# Patient Record
Sex: MALE | Race: WHITE | NOT HISPANIC OR LATINO | Employment: FULL TIME | URBAN - METROPOLITAN AREA
[De-identification: names, ages, dates, MRNs, and addresses within clinical notes are randomized per-mention and may not be internally consistent; named-entity substitution may affect disease eponyms.]

---

## 2024-09-19 ENCOUNTER — HOSPITAL ENCOUNTER (EMERGENCY)
Facility: CLINIC | Age: 54
Discharge: HOME | End: 2024-09-19
Attending: EMERGENCY MEDICINE | Admitting: EMERGENCY MEDICINE

## 2024-09-19 ENCOUNTER — APPOINTMENT (OUTPATIENT)
Dept: CT IMAGING | Facility: CLINIC | Age: 54
End: 2024-09-19
Attending: EMERGENCY MEDICINE

## 2024-09-19 VITALS
HEART RATE: 64 BPM | WEIGHT: 128.31 LBS | TEMPERATURE: 97.2 F | DIASTOLIC BLOOD PRESSURE: 71 MMHG | HEIGHT: 64 IN | SYSTOLIC BLOOD PRESSURE: 120 MMHG | OXYGEN SATURATION: 99 % | RESPIRATION RATE: 28 BRPM | BODY MASS INDEX: 21.91 KG/M2

## 2024-09-19 DIAGNOSIS — R00.2 PALPITATIONS: ICD-10-CM

## 2024-09-19 DIAGNOSIS — D64.9 ANEMIA, UNSPECIFIED TYPE: ICD-10-CM

## 2024-09-19 DIAGNOSIS — S09.90XA CLOSED HEAD INJURY, INITIAL ENCOUNTER: ICD-10-CM

## 2024-09-19 DIAGNOSIS — R55 NEAR SYNCOPE: ICD-10-CM

## 2024-09-19 LAB
ALBUMIN SERPL BCG-MCNC: 4.2 G/DL (ref 3.5–5.2)
ALP SERPL-CCNC: 94 U/L (ref 40–150)
ALT SERPL W P-5'-P-CCNC: 7 U/L (ref 0–70)
ANION GAP SERPL CALCULATED.3IONS-SCNC: 18 MMOL/L (ref 7–15)
AST SERPL W P-5'-P-CCNC: 12 U/L (ref 0–45)
BASOPHILS # BLD AUTO: 0 10E3/UL (ref 0–0.2)
BASOPHILS NFR BLD AUTO: 0 %
BILIRUB SERPL-MCNC: 0.3 MG/DL
BUN SERPL-MCNC: 11.1 MG/DL (ref 6–20)
CALCIUM SERPL-MCNC: 9.6 MG/DL (ref 8.8–10.4)
CHLORIDE SERPL-SCNC: 99 MMOL/L (ref 98–107)
CREAT SERPL-MCNC: 1.21 MG/DL (ref 0.67–1.17)
D DIMER PPP FEU-MCNC: <0.27 UG/ML FEU (ref 0–0.5)
EGFRCR SERPLBLD CKD-EPI 2021: 71 ML/MIN/1.73M2
EOSINOPHIL # BLD AUTO: 0.1 10E3/UL (ref 0–0.7)
EOSINOPHIL NFR BLD AUTO: 1 %
ERYTHROCYTE [DISTWIDTH] IN BLOOD BY AUTOMATED COUNT: 13 % (ref 10–15)
GLUCOSE SERPL-MCNC: 99 MG/DL (ref 70–99)
HCO3 SERPL-SCNC: 23 MMOL/L (ref 22–29)
HCT VFR BLD AUTO: 31.6 % (ref 40–53)
HGB BLD-MCNC: 10 G/DL (ref 13.3–17.7)
IMM GRANULOCYTES # BLD: 0 10E3/UL
IMM GRANULOCYTES NFR BLD: 0 %
LYMPHOCYTES # BLD AUTO: 1.4 10E3/UL (ref 0.8–5.3)
LYMPHOCYTES NFR BLD AUTO: 15 %
MCH RBC QN AUTO: 26.3 PG (ref 26.5–33)
MCHC RBC AUTO-ENTMCNC: 31.6 G/DL (ref 31.5–36.5)
MCV RBC AUTO: 83 FL (ref 78–100)
MONOCYTES # BLD AUTO: 0.9 10E3/UL (ref 0–1.3)
MONOCYTES NFR BLD AUTO: 9 %
NEUTROPHILS # BLD AUTO: 7.2 10E3/UL (ref 1.6–8.3)
NEUTROPHILS NFR BLD AUTO: 74 %
NRBC # BLD AUTO: 0 10E3/UL
NRBC BLD AUTO-RTO: 0 /100
PLATELET # BLD AUTO: 435 10E3/UL (ref 150–450)
POTASSIUM SERPL-SCNC: 3.6 MMOL/L (ref 3.4–5.3)
PROT SERPL-MCNC: 8.1 G/DL (ref 6.4–8.3)
RBC # BLD AUTO: 3.8 10E6/UL (ref 4.4–5.9)
SODIUM SERPL-SCNC: 140 MMOL/L (ref 135–145)
TROPONIN T SERPL HS-MCNC: 15 NG/L
TSH SERPL DL<=0.005 MIU/L-ACNC: 0.97 UIU/ML (ref 0.3–4.2)
WBC # BLD AUTO: 9.7 10E3/UL (ref 4–11)

## 2024-09-19 PROCEDURE — 258N000003 HC RX IP 258 OP 636: Performed by: EMERGENCY MEDICINE

## 2024-09-19 PROCEDURE — 36415 COLL VENOUS BLD VENIPUNCTURE: CPT | Performed by: EMERGENCY MEDICINE

## 2024-09-19 PROCEDURE — 70450 CT HEAD/BRAIN W/O DYE: CPT

## 2024-09-19 PROCEDURE — 84443 ASSAY THYROID STIM HORMONE: CPT | Performed by: EMERGENCY MEDICINE

## 2024-09-19 PROCEDURE — 84484 ASSAY OF TROPONIN QUANT: CPT | Performed by: EMERGENCY MEDICINE

## 2024-09-19 PROCEDURE — 96360 HYDRATION IV INFUSION INIT: CPT

## 2024-09-19 PROCEDURE — 85379 FIBRIN DEGRADATION QUANT: CPT | Performed by: EMERGENCY MEDICINE

## 2024-09-19 PROCEDURE — 80053 COMPREHEN METABOLIC PANEL: CPT | Performed by: EMERGENCY MEDICINE

## 2024-09-19 PROCEDURE — 85025 COMPLETE CBC W/AUTO DIFF WBC: CPT | Performed by: EMERGENCY MEDICINE

## 2024-09-19 PROCEDURE — 99285 EMERGENCY DEPT VISIT HI MDM: CPT | Mod: 25

## 2024-09-19 PROCEDURE — 93005 ELECTROCARDIOGRAM TRACING: CPT

## 2024-09-19 RX ADMIN — SODIUM CHLORIDE 1000 ML: 9 INJECTION, SOLUTION INTRAVENOUS at 21:57

## 2024-09-19 ASSESSMENT — ACTIVITIES OF DAILY LIVING (ADL)
ADLS_ACUITY_SCORE: 35
ADLS_ACUITY_SCORE: 33

## 2024-09-19 ASSESSMENT — COLUMBIA-SUICIDE SEVERITY RATING SCALE - C-SSRS
2. HAVE YOU ACTUALLY HAD ANY THOUGHTS OF KILLING YOURSELF IN THE PAST MONTH?: NO
6. HAVE YOU EVER DONE ANYTHING, STARTED TO DO ANYTHING, OR PREPARED TO DO ANYTHING TO END YOUR LIFE?: NO
1. IN THE PAST MONTH, HAVE YOU WISHED YOU WERE DEAD OR WISHED YOU COULD GO TO SLEEP AND NOT WAKE UP?: NO

## 2024-09-20 LAB
ATRIAL RATE - MUSE: 60 BPM
DIASTOLIC BLOOD PRESSURE - MUSE: NORMAL MMHG
INTERPRETATION ECG - MUSE: NORMAL
P AXIS - MUSE: 45 DEGREES
PR INTERVAL - MUSE: 134 MS
QRS DURATION - MUSE: 86 MS
QT - MUSE: 400 MS
QTC - MUSE: 400 MS
R AXIS - MUSE: 18 DEGREES
SYSTOLIC BLOOD PRESSURE - MUSE: NORMAL MMHG
T AXIS - MUSE: 28 DEGREES
VENTRICULAR RATE- MUSE: 60 BPM

## 2024-09-20 NOTE — ED TRIAGE NOTES
While at work pt got dizzy and lost his balance and hit his head on the wall at 1630. No LOC. Pt had similar symptoms 9/9.  Pt was seen at  and sent here. Pt denies chest pain or shortness of breath

## 2024-09-20 NOTE — ED PROVIDER NOTES
"Emergency Department Note      History of Present Illness     Chief Complaint   Dizziness    HPI   Shad Licea is a 54 year old male with a history of hypertension, hyperlipidemia, and type 2 diabetes mellitus who presents to the ED for dizziness. On September 9th, the patient was at work when he became pale and dizzy. He subsequently sat down and took a break; however, the symptoms persisted for 15 minutes. He also noted chest pain and shortness of breath at that time. Again, today, the patient was at work, working in a hot kitchen, when, around 1530, he started to feel overheated and dizzy. He then sat down and took a bit of a break. When he stood up, he fell against the wall and ended up with his head \"in a ball.\" Right before this, the patient noted his vision was starting to go black and his heart was racing. He denies loss of consciousness. He felt fine prior to this. No chest pain today. Of note, he states that he has been staying hydrated all day; however, since starting work around noon, he did not void until 1830. Currently, he is experiencing a slight headache. Patient denies black or bloody stools. No dysuria. No leg swelling or history of blood clots. He adds that whenever he is up and moving around, he loses his appetite. Patient also notes that both his mother and grandmother were diagnosed with atrial fibrillation around the age that he is currently.     Independent Historian   None      Past Medical History   Medical History and Problem List  Anxiety  Chronic kidney disease  Dyslipidemia  Autism  Depression  Hypertension  Migraine  Type 2 diabetes mellitus  Hypercholesteremia  Hyperlipidemia   Ureteral calculus    Medications   Sumatriptan  Aspirin 81 mg  Metformin  Atorvastatin  Lisinopril  Sertraline    Physical Exam   Patient Vitals for the past 24 hrs:   BP Temp Temp src Pulse Resp SpO2 Height Weight   09/19/24 2219 -- -- -- 64 28 99 % -- --   09/19/24 2204 120/71 -- -- 73 26 98 % -- -- " "  09/19/24 2159 -- -- -- 65 19 99 % -- --   09/19/24 2153 -- -- -- 79 26 98 % -- --   09/19/24 2138 -- -- -- 80 15 99 % -- --   09/19/24 2108 -- -- -- 69 28 100 % -- --   09/19/24 2053 -- -- -- 66 24 98 % -- --   09/19/24 1951 (!) 133/93 97.2  F (36.2  C) Temporal 69 18 100 % 1.626 m (5' 4\") 58.2 kg (128 lb 4.9 oz)     Physical Exam  VS: Reviewed per above  HENT: Mucous membranes moist, no nuchal rigidity  EYES: sclera anicteric  CV: Rate as noted,  regular rhythm.   RESP: Effort normal. Breath sounds are normal bilaterally.  GI: no tenderness/rebound/guarding, not distended.  NEURO: GCS 15, cranial nerves II through XII are intact, 5 out of 5 strength in all 4 extremities, sensation is intact light touch in all 4 extremities  MSK: No deformity of the extremities  SKIN: Warm and dry      Diagnostics   Lab Results   Labs Ordered and Resulted from Time of ED Arrival to Time of ED Departure   COMPREHENSIVE METABOLIC PANEL - Abnormal       Result Value    Sodium 140      Potassium 3.6      Carbon Dioxide (CO2) 23      Anion Gap 18 (*)     Urea Nitrogen 11.1      Creatinine 1.21 (*)     GFR Estimate 71      Calcium 9.6      Chloride 99      Glucose 99      Alkaline Phosphatase 94      AST 12      ALT 7      Protein Total 8.1      Albumin 4.2      Bilirubin Total 0.3     CBC WITH PLATELETS AND DIFFERENTIAL - Abnormal    WBC Count 9.7      RBC Count 3.80 (*)     Hemoglobin 10.0 (*)     Hematocrit 31.6 (*)     MCV 83      MCH 26.3 (*)     MCHC 31.6      RDW 13.0      Platelet Count 435      % Neutrophils 74      % Lymphocytes 15      % Monocytes 9      % Eosinophils 1      % Basophils 0      % Immature Granulocytes 0      NRBCs per 100 WBC 0      Absolute Neutrophils 7.2      Absolute Lymphocytes 1.4      Absolute Monocytes 0.9      Absolute Eosinophils 0.1      Absolute Basophils 0.0      Absolute Immature Granulocytes 0.0      Absolute NRBCs 0.0     TROPONIN T, HIGH SENSITIVITY - Normal    Troponin T, High Sensitivity 15  "    D DIMER QUANTITATIVE - Normal    D-Dimer Quantitative <0.27     TSH WITH FREE T4 REFLEX - Normal    TSH 0.97       Imaging   Head CT w/o contrast   Final Result   IMPRESSION:   1.  No acute intracranial process.   2.  Complete opacification of the right sphenoid sinus.        EKG   ECG taken at 1959, ECG read at 2010  Normal sinus rhythm  Minimal voltage criteria for LVH, may be normal variant (R in aVL)  Borderline ECG   Rate 60 bpm. PA interval 134 ms. QRS duration 86 ms. QT/QTc 400/400 ms. P-R-T axes 45 18 28.      ED Course    Medications Administered   Medications   sodium chloride 0.9% BOLUS 1,000 mL (1,000 mLs Intravenous $New Bag 9/19/24 2157)     Procedures   Procedures       ED Course   ED Course as of 09/19/24 2223   Thu Sep 19, 2024   1955 I obtained history and examined the patient as noted above.     2148 I rechecked and updated the patient. He would like to go home following a negative head CT result.       Additional Documentation  None    Medical Decision Making / Diagnosis   CMS Diagnoses: None    MIPS       None    [facility]   Shad Licea is a 54 year old male who presents to the ER for evaluation of episode of near syncope and head injury today.  Vital signs reassuring.  He does have evidence of heart rate change when going from sitting to standing and is technically orthostatic.  He was given IV fluids.  Certainly possible he has underlying orthostatic near syncope after working in a hot kitchen and not consuming enough fluids today.  ECG sinus rhythm without specific ischemic change.  Single troponin is negative.  No chest pain or signs of ACS at this juncture.  Normal D-dimer speaks against occult PE.  Noncontrast head CT is negative for acute CNS pathology.  Labs do reveal anemia of unclear chronicity.  Recommended prompt outpatient primary care follow-up to address his near syncopal episodes and evaluate anemia further.  I did offer outpatient cardiac monitoring to evaluate for underlying  dysrhythmia that could be contributing to the near syncopal episodes.  Patient declined but seemed interested in talking with his primary care doctor about this further.  Return precautions discussed.  At signout to my colleague, patient was completing his fluid bolus prior to discharge.    Disposition   The patient was discharged.     Diagnosis     ICD-10-CM    1. Anemia, unspecified type  D64.9       2. Near syncope  R55       3. Closed head injury, initial encounter  S09.90XA       4. Palpitations  R00.2          Discharge Medications   New Prescriptions    No medications on file     Scribe Disclosure:  I, Dick Lugo, am serving as a scribe at 8:14 PM on 9/19/2024 to document services personally performed by Manuel Mirza MD based on my observations and the provider's statements to me.        Manuel Mirza MD  09/19/24 2734

## 2024-09-24 ENCOUNTER — HOSPITAL ENCOUNTER (EMERGENCY)
Facility: CLINIC | Age: 54
Discharge: HOME | End: 2024-09-24
Attending: EMERGENCY MEDICINE | Admitting: EMERGENCY MEDICINE
Payer: MEDICAID

## 2024-09-24 ENCOUNTER — APPOINTMENT (OUTPATIENT)
Dept: GENERAL RADIOLOGY | Facility: CLINIC | Age: 54
End: 2024-09-24
Attending: EMERGENCY MEDICINE
Payer: MEDICAID

## 2024-09-24 VITALS
HEART RATE: 62 BPM | TEMPERATURE: 98 F | DIASTOLIC BLOOD PRESSURE: 67 MMHG | HEIGHT: 64 IN | SYSTOLIC BLOOD PRESSURE: 126 MMHG | OXYGEN SATURATION: 100 % | BODY MASS INDEX: 23.39 KG/M2 | RESPIRATION RATE: 16 BRPM | WEIGHT: 137 LBS

## 2024-09-24 DIAGNOSIS — D64.9 ANEMIA, UNSPECIFIED TYPE: ICD-10-CM

## 2024-09-24 DIAGNOSIS — R07.89 ATYPICAL CHEST PAIN: ICD-10-CM

## 2024-09-24 LAB
ANION GAP SERPL CALCULATED.3IONS-SCNC: 12 MMOL/L (ref 7–15)
BASOPHILS # BLD AUTO: 0 10E3/UL (ref 0–0.2)
BASOPHILS NFR BLD AUTO: 0 %
BUN SERPL-MCNC: 10.3 MG/DL (ref 6–20)
CALCIUM SERPL-MCNC: 8.9 MG/DL (ref 8.8–10.4)
CHLORIDE SERPL-SCNC: 103 MMOL/L (ref 98–107)
CREAT SERPL-MCNC: 0.97 MG/DL (ref 0.67–1.17)
EGFRCR SERPLBLD CKD-EPI 2021: >90 ML/MIN/1.73M2
EOSINOPHIL # BLD AUTO: 0.2 10E3/UL (ref 0–0.7)
EOSINOPHIL NFR BLD AUTO: 2 %
ERYTHROCYTE [DISTWIDTH] IN BLOOD BY AUTOMATED COUNT: 13.2 % (ref 10–15)
GLUCOSE BLDC GLUCOMTR-MCNC: 107 MG/DL (ref 70–99)
GLUCOSE SERPL-MCNC: 105 MG/DL (ref 70–99)
HCO3 SERPL-SCNC: 25 MMOL/L (ref 22–29)
HCT VFR BLD AUTO: 29 % (ref 40–53)
HGB BLD-MCNC: 9 G/DL (ref 13.3–17.7)
HOLD SPECIMEN: NORMAL
HOLD SPECIMEN: NORMAL
IMM GRANULOCYTES # BLD: 0 10E3/UL
IMM GRANULOCYTES NFR BLD: 0 %
LYMPHOCYTES # BLD AUTO: 1.2 10E3/UL (ref 0.8–5.3)
LYMPHOCYTES NFR BLD AUTO: 13 %
MCH RBC QN AUTO: 26.4 PG (ref 26.5–33)
MCHC RBC AUTO-ENTMCNC: 31 G/DL (ref 31.5–36.5)
MCV RBC AUTO: 85 FL (ref 78–100)
MONOCYTES # BLD AUTO: 0.8 10E3/UL (ref 0–1.3)
MONOCYTES NFR BLD AUTO: 9 %
NEUTROPHILS # BLD AUTO: 7.1 10E3/UL (ref 1.6–8.3)
NEUTROPHILS NFR BLD AUTO: 75 %
NRBC # BLD AUTO: 0 10E3/UL
NRBC BLD AUTO-RTO: 0 /100
PLATELET # BLD AUTO: 323 10E3/UL (ref 150–450)
POTASSIUM SERPL-SCNC: 4.3 MMOL/L (ref 3.4–5.3)
RBC # BLD AUTO: 3.41 10E6/UL (ref 4.4–5.9)
SODIUM SERPL-SCNC: 140 MMOL/L (ref 135–145)
TROPONIN T SERPL HS-MCNC: <6 NG/L
WBC # BLD AUTO: 9.4 10E3/UL (ref 4–11)

## 2024-09-24 PROCEDURE — 71046 X-RAY EXAM CHEST 2 VIEWS: CPT

## 2024-09-24 PROCEDURE — 85025 COMPLETE CBC W/AUTO DIFF WBC: CPT | Performed by: EMERGENCY MEDICINE

## 2024-09-24 PROCEDURE — 80048 BASIC METABOLIC PNL TOTAL CA: CPT | Performed by: EMERGENCY MEDICINE

## 2024-09-24 PROCEDURE — 82962 GLUCOSE BLOOD TEST: CPT

## 2024-09-24 PROCEDURE — 99285 EMERGENCY DEPT VISIT HI MDM: CPT | Mod: 25

## 2024-09-24 PROCEDURE — 36415 COLL VENOUS BLD VENIPUNCTURE: CPT | Performed by: EMERGENCY MEDICINE

## 2024-09-24 PROCEDURE — 93005 ELECTROCARDIOGRAM TRACING: CPT

## 2024-09-24 PROCEDURE — 84484 ASSAY OF TROPONIN QUANT: CPT | Performed by: EMERGENCY MEDICINE

## 2024-09-24 ASSESSMENT — ACTIVITIES OF DAILY LIVING (ADL)
ADLS_ACUITY_SCORE: 35

## 2024-09-24 ASSESSMENT — COLUMBIA-SUICIDE SEVERITY RATING SCALE - C-SSRS
1. IN THE PAST MONTH, HAVE YOU WISHED YOU WERE DEAD OR WISHED YOU COULD GO TO SLEEP AND NOT WAKE UP?: NO
2. HAVE YOU ACTUALLY HAD ANY THOUGHTS OF KILLING YOURSELF IN THE PAST MONTH?: NO
6. HAVE YOU EVER DONE ANYTHING, STARTED TO DO ANYTHING, OR PREPARED TO DO ANYTHING TO END YOUR LIFE?: NO

## 2024-09-24 NOTE — ED PROVIDER NOTES
"  Emergency Department Note      History of Present Illness     Chief Complaint   Chest Pain and Dizziness      HPI   Shad Licea is a 54 year old male with history of type 2 diabetes mellitus, CKD 3a, and migraines who presents to the ED with his wife for evaluation of chest pain and dizziness. Shad reports he developed a headache today after leaving the chiropractor at 1345. He then developed left upper chest pressure/tightness and numbness to his left arm. Patient described to his wife that his head felt \"cobwebby\", which is what he described before a recent episode of syncope. Patient presented to [facility] today for these symptoms and was referred to the ED.    Patient was in [place] ED 2 days ago (9/22/24) for dizziness and seizure activity and had Hgb of 9.1 at that time. He was to be admitted but the hospitalist denied. Patient had an episode of syncope on 9/19/24 and was seen at [facility] ED, Hgb 10 at that time. Patient had another episode of syncope on 9/9/24 with sudden dizziness, lightheadedness, and chest tightness.    Independent Historian   Wife as detailed above.    Review of External Notes   I reviewed [facility] note from earlier today for chest pain.    Past Medical History     Medical History and Problem List   Autism  Anxiety  Depression  Hypertension  Hyperlipidemia  Migraine with aura  CKD 3a  Type 2 diabetes mellitus  Right ureteral calculus    Medications   Lancets  Dexcom  Metformin  Atorvastatin  Lisinopril  Sertraline    Surgical History   The patient does not have any pertinent past surgical history.   Physical Exam     Patient Vitals for the past 24 hrs:   BP Temp Temp src Pulse Resp SpO2 Height Weight   09/24/24 1800 126/67 -- -- 62 -- 100 % -- --   09/24/24 1750 -- -- -- -- -- 100 % -- --   09/24/24 1740 -- -- -- -- -- 100 % -- --   09/24/24 1730 129/71 -- -- 75 -- 100 % -- --   09/24/24 1710 -- -- -- -- -- 100 % -- --   09/24/24 1700 122/75 -- -- " "73 -- 100 % -- --   09/24/24 1650 130/68 -- -- -- -- 100 % -- --   09/24/24 1610 -- -- -- 69 -- 100 % -- --   09/24/24 1557 123/59 98  F (36.7  C) Temporal 66 16 100 % 1.626 m (5' 4\") 62.1 kg (137 lb)     Physical Exam    HENT:  mmm, no rhinorrhea,.  Pupils equal, no nystagmus, atraumatic  Eyes: periorbital tissues and sclera normal   Neck: supple, no abnormal swelling, painless range of motion  Lungs:  CTAB,  no resp distress  CV: rrr, no m/r/g, ppi  Abd: soft, nontender, nondistended, no rebound/masses/guarding/hsm  Ext: no peripheral edema  Skin: warm, dry, well perfused, no rashes/bruising/lesions on exposed skin  Neuro: alert, MAEE, no gross motor or sensory deficits, gait stable  Psych: Normal mood, normal affect      Diagnostics     Lab Results   Labs Ordered and Resulted from Time of ED Arrival to Time of ED Departure   BASIC METABOLIC PANEL - Abnormal       Result Value    Sodium 140      Potassium 4.3      Chloride 103      Carbon Dioxide (CO2) 25      Anion Gap 12      Urea Nitrogen 10.3      Creatinine 0.97      GFR Estimate >90      Calcium 8.9      Glucose 105 (*)    CBC WITH PLATELETS AND DIFFERENTIAL - Abnormal    WBC Count 9.4      RBC Count 3.41 (*)     Hemoglobin 9.0 (*)     Hematocrit 29.0 (*)     MCV 85      MCH 26.4 (*)     MCHC 31.0 (*)     RDW 13.2      Platelet Count 323      % Neutrophils 75      % Lymphocytes 13      % Monocytes 9      % Eosinophils 2      % Basophils 0      % Immature Granulocytes 0      NRBCs per 100 WBC 0      Absolute Neutrophils 7.1      Absolute Lymphocytes 1.2      Absolute Monocytes 0.8      Absolute Eosinophils 0.2      Absolute Basophils 0.0      Absolute Immature Granulocytes 0.0      Absolute NRBCs 0.0     GLUCOSE BY METER - Abnormal    GLUCOSE BY METER POCT 107 (*)    TROPONIN T, HIGH SENSITIVITY - Normal    Troponin T, High Sensitivity <6     GLUCOSE MONITOR NURSING POCT       Imaging   XR Chest 2 Views   Final Result   IMPRESSION: No focal consolidation, " pleural effusion or pneumothorax.   Cardiomediastinal silhouette is unremarkable.      TRACEY RUGGIERO MD            SYSTEM ID:  [id number]          EKG   ECG results from 09/24/24   EKG 12-lead, tracing only     Value    Systolic Blood Pressure     Diastolic Blood Pressure     Ventricular Rate 70    Atrial Rate 70    NH Interval 132    QRS Duration 78        QTc 432    P Axis 46    R AXIS 28    T Axis 33    Interpretation ECG      Sinus rhythm  Normal ECG  When compared with ECG of 19-Sep-2024 19:59,  No significant change was found  Interpreted by me at 1610          Independent Interpretation   Chest Radiograph without Pneumothorax, Lobar opacity, nor concerning cardiomegaly or pulm edema/pleural effusion    ED Course      Medications Administered   Medications - No data to display    Procedures   Procedures     Discussion of Management  See below    ED Course   ED Course as of 09/24/24 1808   Tue Sep 24, 2024   1610 I obtained history and examined the patient as noted above.    1807 I rechecked the patient and explained findings. Patient discharged home with instructions regarding supportive care, medications, and reasons to return. The importance of close follow-up was reviewed.        Additional Documentation  None    Medical Decision Making / Diagnosis     CMS Diagnoses: None    MIPS   None    MDM   Shad Licea is a 54 year old male presenting with atypical chest discomfort and shortness of breath.  Workup here is unremarkable for significant emergent cardiopulmonary or vascular process.  No significant clinical suspicion for pulmonary embolism or dissection.  EKG with no evidence of acute occlusive coronary process, troponin undetectable.  Hemoglobin similar to recent levels in the 9 range.  He is not endorsing melena hematochezia there is no other signs of external loss.  Recent ferritin study by PCP was unremarkable.  Seems less likely to be a hemolytic process.  No indication for emergent  transfusion can continue to be worked up as an outpatient.  Chest radiograph negative.  Stable for discharge home after risk stratification here.  He understands what is known was unknown to watch out for when return here to the emergency department.    Disposition   The patient was discharged.     Diagnosis     ICD-10-CM    1. Atypical chest pain  R07.89       2. Anemia, unspecified type  D64.9            Discharge Medications   New Prescriptions    No medications on file         Scribe Disclosure:  I, Mica Crump, am serving as a scribe at 4:27 PM on 9/24/2024 to document services personally performed by Romel Rivas MD based on my observations and the provider's statements to me.        Romel Rivas MD  09/24/24 [id number]

## 2024-09-24 NOTE — ED TRIAGE NOTES
At clinic today for redraw of lab work when patient mentioned that he had chest pain that started about 40 minutes prior to that. Hx of recent falls with seizure like activity.

## 2024-09-25 LAB
ATRIAL RATE - MUSE: 70 BPM
DIASTOLIC BLOOD PRESSURE - MUSE: NORMAL MMHG
INTERPRETATION ECG - MUSE: NORMAL
P AXIS - MUSE: 46 DEGREES
PR INTERVAL - MUSE: 132 MS
QRS DURATION - MUSE: 78 MS
QT - MUSE: 400 MS
QTC - MUSE: 432 MS
R AXIS - MUSE: 28 DEGREES
SYSTOLIC BLOOD PRESSURE - MUSE: NORMAL MMHG
T AXIS - MUSE: 33 DEGREES
VENTRICULAR RATE- MUSE: 70 BPM